# Patient Record
Sex: MALE | Race: WHITE | NOT HISPANIC OR LATINO | Employment: OTHER | URBAN - METROPOLITAN AREA
[De-identification: names, ages, dates, MRNs, and addresses within clinical notes are randomized per-mention and may not be internally consistent; named-entity substitution may affect disease eponyms.]

---

## 2017-12-13 NOTE — PATIENT DISCUSSION
DRY EYES : Discussed with patient the importance of keeping the eye moist and the symptoms associated with dry eyes including blurry vision, tearing, burning, and mariela sensation. Advised patient to minimize use of any fans blowing directly on the face. Advised patient to continue with artificial tears 2-3 times daily.

## 2018-02-24 NOTE — PATIENT DISCUSSION
CATARACTS, OU - VISUALLY SIGNIFICANT. SCHEDULE OD FIRST THEN LATER IN OS IF VISUAL SYMPTOMS PERSIST. negative...

## 2018-03-06 NOTE — PATIENT DISCUSSION
New Prescription: Pred Forte (prednisolone acetate): drops,suspension: 1% 1 drop four times a day into affected eye 03-

## 2018-03-15 NOTE — PATIENT DISCUSSION
Continue: Pred Forte (prednisolone acetate): drops,suspension: 1% 1 drop four times a day into affected eye 03-

## 2018-07-10 NOTE — PATIENT DISCUSSION
Continue: prednisolone acetate (prednisolone acetate): drops,suspension: 1% 1 drop four times a day as directed into affected eye 07-

## 2018-07-10 NOTE — PATIENT DISCUSSION
Continue: ketorolac (ketorolac): drops: 0.5% 1 drop four times a day as directed into affected eye 07-

## 2018-07-10 NOTE — PATIENT DISCUSSION
Continue: ofloxacin (ofloxacin): drops: 0.3% 1 drop four times a day as directed into affected eye 07-

## 2018-09-20 NOTE — PATIENT DISCUSSION
POST-OP INSTRUCTIONS:  The patient is told to resume normal daily activities. New spectacle prescripton given to patient. Patient told to discontinue Acular and Oculfloxacin. Patient to taper prednisolone with a 3,2,1 taper and then discontinue. Will see patient back in 3 months for a DFE. Monitor.

## 2021-06-18 NOTE — PATIENT DISCUSSION
09/20/2018OS-0.75+[id number]&nbsp;SN &nbsp; &nbsp; bem Metronidazole Pregnancy And Lactation Text: This medication is Pregnancy Category B and considered safe during pregnancy.  It is also excreted in breast milk.

## 2021-08-12 ENCOUNTER — NEW REFERRAL (OUTPATIENT)
Dept: URBAN - METROPOLITAN AREA CLINIC 26 | Facility: CLINIC | Age: 71
End: 2021-08-12

## 2021-08-12 VITALS
SYSTOLIC BLOOD PRESSURE: 150 MMHG | HEIGHT: 67 IN | DIASTOLIC BLOOD PRESSURE: 62 MMHG | BODY MASS INDEX: 43 KG/M2 | HEART RATE: 66 BPM | WEIGHT: 274 LBS

## 2021-08-12 DIAGNOSIS — H04.123: ICD-10-CM

## 2021-08-12 DIAGNOSIS — Z79.4: ICD-10-CM

## 2021-08-12 DIAGNOSIS — E11.3313: ICD-10-CM

## 2021-08-12 PROCEDURE — 92134 CPTRZ OPH DX IMG PST SGM RTA: CPT

## 2021-08-12 PROCEDURE — 99204 OFFICE O/P NEW MOD 45 MIN: CPT

## 2021-08-12 PROCEDURE — 67028 INJECTION EYE DRUG: CPT

## 2021-08-12 ASSESSMENT — VISUAL ACUITY
OD_PH: 20/30-1
OS_SC: 20/200
OS_PH: 20/70
OD_SC: 20/40

## 2021-08-12 ASSESSMENT — TONOMETRY
OD_IOP_MMHG: 17
OS_IOP_MMHG: 14

## 2021-09-17 ENCOUNTER — CLINICAL PROCEDURE AND DIAGNOSTIC TESTING ONLY (OUTPATIENT)
Dept: URBAN - METROPOLITAN AREA CLINIC 26 | Facility: CLINIC | Age: 71
End: 2021-09-17

## 2021-09-17 DIAGNOSIS — Z79.4: ICD-10-CM

## 2021-09-17 DIAGNOSIS — E11.3313: ICD-10-CM

## 2021-09-17 PROCEDURE — 67028 INJECTION EYE DRUG: CPT

## 2021-09-17 PROCEDURE — 92250 FUNDUS PHOTOGRAPHY W/I&R: CPT

## 2021-09-17 PROCEDURE — 92134 CPTRZ OPH DX IMG PST SGM RTA: CPT

## 2021-10-22 ENCOUNTER — CLINICAL PROCEDURE AND DIAGNOSTIC TESTING ONLY (OUTPATIENT)
Dept: URBAN - METROPOLITAN AREA CLINIC 26 | Facility: CLINIC | Age: 71
End: 2021-10-22

## 2021-10-22 DIAGNOSIS — E11.3311: ICD-10-CM

## 2021-10-22 DIAGNOSIS — E11.3312: ICD-10-CM

## 2021-10-22 PROCEDURE — 92250 FUNDUS PHOTOGRAPHY W/I&R: CPT

## 2021-10-22 PROCEDURE — 92134 CPTRZ OPH DX IMG PST SGM RTA: CPT

## 2021-10-22 PROCEDURE — 67028 INJECTION EYE DRUG: CPT

## 2021-12-03 ENCOUNTER — FOLLOW UP AND POST INJECTION EVALUATION (OUTPATIENT)
Dept: URBAN - METROPOLITAN AREA CLINIC 26 | Facility: CLINIC | Age: 71
End: 2021-12-03

## 2021-12-03 VITALS — HEIGHT: 60 IN | HEART RATE: 50 BPM | DIASTOLIC BLOOD PRESSURE: 75 MMHG | SYSTOLIC BLOOD PRESSURE: 128 MMHG

## 2021-12-03 DIAGNOSIS — Z79.4: ICD-10-CM

## 2021-12-03 DIAGNOSIS — H04.123: ICD-10-CM

## 2021-12-03 DIAGNOSIS — E11.3312: ICD-10-CM

## 2021-12-03 DIAGNOSIS — E11.3311: ICD-10-CM

## 2021-12-03 PROCEDURE — 67210 TREATMENT OF RETINAL LESION: CPT

## 2021-12-03 PROCEDURE — 92012 INTRM OPH EXAM EST PATIENT: CPT

## 2021-12-03 PROCEDURE — 92250 FUNDUS PHOTOGRAPHY W/I&R: CPT

## 2021-12-03 PROCEDURE — 92235 FLUORESCEIN ANGRPH MLTIFRAME: CPT

## 2021-12-03 ASSESSMENT — VISUAL ACUITY
OS_SC: 20/200
OD_SC: 20/70

## 2021-12-03 ASSESSMENT — TONOMETRY
OS_IOP_MMHG: 10
OD_IOP_MMHG: 12

## 2022-02-25 ENCOUNTER — FOLLOW UP (OUTPATIENT)
Dept: URBAN - METROPOLITAN AREA CLINIC 26 | Facility: CLINIC | Age: 72
End: 2022-02-25

## 2022-02-25 DIAGNOSIS — E11.3311: ICD-10-CM

## 2022-02-25 DIAGNOSIS — E11.3312: ICD-10-CM

## 2022-02-25 PROCEDURE — 67210 TREATMENT OF RETINAL LESION: CPT

## 2022-02-25 PROCEDURE — 92134 CPTRZ OPH DX IMG PST SGM RTA: CPT

## 2022-02-25 ASSESSMENT — TONOMETRY
OS_IOP_MMHG: 12
OD_IOP_MMHG: 11

## 2022-02-25 ASSESSMENT — VISUAL ACUITY
OS_PH: 20/40
OS_SC: 20/80+2
OD_SC: 20/30

## 2022-04-07 ENCOUNTER — EMERGENCY VISIT (OUTPATIENT)
Dept: URBAN - METROPOLITAN AREA CLINIC 26 | Facility: CLINIC | Age: 72
End: 2022-04-07

## 2022-04-07 VITALS — WEIGHT: 274 LBS | HEIGHT: 67 IN | BODY MASS INDEX: 43 KG/M2

## 2022-04-07 DIAGNOSIS — H10.023: ICD-10-CM

## 2022-04-07 DIAGNOSIS — Z79.4: ICD-10-CM

## 2022-04-07 DIAGNOSIS — E11.3313: ICD-10-CM

## 2022-04-07 PROCEDURE — 92012 INTRM OPH EXAM EST PATIENT: CPT

## 2022-04-07 RX ORDER — NEOMYCIN SULFATE, POLYMYXIN B SULFATE AND DEXAMETHASONE SUSPENSION/ DROPS 1; 3.5; 1 MG/ML; MG/ML; [USP'U]/ML: 1 SUSPENSION/ DROPS TOPICAL

## 2022-04-07 ASSESSMENT — VISUAL ACUITY
OD_SC: 20/50+1
OS_PH: 20/50
OS_SC: 20/200
OD_PH: 20/25-1

## 2022-04-07 ASSESSMENT — TONOMETRY
OS_IOP_MMHG: 14
OD_IOP_MMHG: 11

## 2022-06-09 ENCOUNTER — FOLLOW UP (OUTPATIENT)
Dept: URBAN - METROPOLITAN AREA CLINIC 26 | Facility: CLINIC | Age: 72
End: 2022-06-09

## 2022-06-09 VITALS
BODY MASS INDEX: 41.91 KG/M2 | HEART RATE: 61 BPM | DIASTOLIC BLOOD PRESSURE: 71 MMHG | SYSTOLIC BLOOD PRESSURE: 197 MMHG | HEIGHT: 67 IN | WEIGHT: 267 LBS

## 2022-06-09 DIAGNOSIS — H04.123: ICD-10-CM

## 2022-06-09 DIAGNOSIS — H25.13: ICD-10-CM

## 2022-06-09 DIAGNOSIS — Z79.4: ICD-10-CM

## 2022-06-09 DIAGNOSIS — E11.3313: ICD-10-CM

## 2022-06-09 PROCEDURE — 92250 FUNDUS PHOTOGRAPHY W/I&R: CPT

## 2022-06-09 PROCEDURE — 92235 FLUORESCEIN ANGRPH MLTIFRAME: CPT

## 2022-06-09 PROCEDURE — 92134 CPTRZ OPH DX IMG PST SGM RTA: CPT

## 2022-06-09 PROCEDURE — 67028 INJECTION EYE DRUG: CPT

## 2022-06-09 PROCEDURE — 92014 COMPRE OPH EXAM EST PT 1/>: CPT

## 2022-06-09 ASSESSMENT — TONOMETRY
OD_IOP_MMHG: 14
OS_IOP_MMHG: 15

## 2022-06-09 ASSESSMENT — VISUAL ACUITY
OS_SC: 20/80
OD_SC: 20/80
OS_PH: 20/50
OD_PH: 20/40

## 2022-07-09 ENCOUNTER — TELEPHONE ENCOUNTER (OUTPATIENT)
Dept: URBAN - METROPOLITAN AREA CLINIC 121 | Facility: CLINIC | Age: 72
End: 2022-07-09

## 2022-07-10 ENCOUNTER — TELEPHONE ENCOUNTER (OUTPATIENT)
Dept: URBAN - METROPOLITAN AREA CLINIC 121 | Facility: CLINIC | Age: 72
End: 2022-07-10

## 2022-07-10 RX ORDER — ASPIRIN 81 MG/1
TABLET, CHEWABLE ORAL ONCE A DAY
Refills: 0 | Status: ACTIVE | COMMUNITY
Start: 2017-04-05

## 2022-07-10 RX ORDER — INSULIN ASPART 100 [IU]/ML
INJECTION, SOLUTION INTRAVENOUS; SUBCUTANEOUS
Refills: 0 | Status: ACTIVE | COMMUNITY
Start: 2017-04-05

## 2022-07-10 RX ORDER — FENOFIBRATE 134 MG/1
CAPSULE ORAL ONCE A DAY
Refills: 0 | Status: ACTIVE | COMMUNITY
Start: 2017-04-05

## 2022-07-10 RX ORDER — ROSUVASTATIN CALCIUM 40 MG/1
TABLET, FILM COATED ORAL ONCE A DAY
Refills: 0 | Status: ACTIVE | COMMUNITY
Start: 2017-04-05

## 2022-07-10 RX ORDER — INSULIN GLARGINE 100 [IU]/ML
INJECTION, SOLUTION SUBCUTANEOUS ONCE A DAY
Refills: 0 | Status: ACTIVE | COMMUNITY
Start: 2017-04-05

## 2022-07-10 RX ORDER — LISINOPRIL 40 MG/1
TABLET ORAL ONCE A DAY
Refills: 0 | Status: ACTIVE | COMMUNITY
Start: 2017-04-05

## 2022-07-10 RX ORDER — DAPAGLIFLOZIN 5 MG/1
TABLET, FILM COATED ORAL ONCE A DAY
Refills: 0 | Status: ACTIVE | COMMUNITY
Start: 2017-04-05

## 2022-07-10 RX ORDER — METOPROLOL TARTRATE 50 MG/1
TABLET, FILM COATED ORAL ONCE A DAY
Refills: 0 | Status: ACTIVE | COMMUNITY
Start: 2017-04-05

## 2022-07-10 RX ORDER — METFORMIN HCL 1000 MG/1
TABLET ORAL TWICE A DAY
Refills: 0 | Status: ACTIVE | COMMUNITY
Start: 2017-04-05

## 2022-08-18 ENCOUNTER — CLINIC PROCEDURE ONLY (OUTPATIENT)
Dept: URBAN - METROPOLITAN AREA CLINIC 26 | Facility: CLINIC | Age: 72
End: 2022-08-18

## 2022-08-18 DIAGNOSIS — E11.3313: ICD-10-CM

## 2022-08-18 DIAGNOSIS — Z79.4: ICD-10-CM

## 2022-08-18 PROCEDURE — 92250 FUNDUS PHOTOGRAPHY W/I&R: CPT

## 2022-08-18 PROCEDURE — 67028 INJECTION EYE DRUG: CPT

## 2022-08-18 PROCEDURE — 92134 CPTRZ OPH DX IMG PST SGM RTA: CPT

## 2022-09-23 ENCOUNTER — CLINIC PROCEDURE ONLY (OUTPATIENT)
Dept: URBAN - METROPOLITAN AREA CLINIC 26 | Facility: CLINIC | Age: 72
End: 2022-09-23

## 2022-09-23 DIAGNOSIS — E11.3313: ICD-10-CM

## 2022-09-23 DIAGNOSIS — Z79.4: ICD-10-CM

## 2022-09-23 PROCEDURE — 67028 INJECTION EYE DRUG: CPT

## 2022-09-23 PROCEDURE — 92134 CPTRZ OPH DX IMG PST SGM RTA: CPT

## 2022-12-16 ENCOUNTER — FOLLOW UP (OUTPATIENT)
Dept: URBAN - METROPOLITAN AREA CLINIC 26 | Facility: CLINIC | Age: 72
End: 2022-12-16

## 2022-12-16 PROCEDURE — 67028 INJECTION EYE DRUG: CPT

## 2022-12-16 PROCEDURE — 92014 COMPRE OPH EXAM EST PT 1/>: CPT

## 2022-12-16 PROCEDURE — 92250 FUNDUS PHOTOGRAPHY W/I&R: CPT

## 2022-12-16 PROCEDURE — 92134 CPTRZ OPH DX IMG PST SGM RTA: CPT

## 2022-12-16 ASSESSMENT — VISUAL ACUITY
OS_SC: 20/200+2
OS_PH: 20/40
OD_SC: 20/80-1
OD_PH: 20/30-1

## 2022-12-16 ASSESSMENT — TONOMETRY
OS_IOP_MMHG: 13
OD_IOP_MMHG: 17

## 2023-01-27 ENCOUNTER — CLINIC PROCEDURE ONLY (OUTPATIENT)
Dept: URBAN - METROPOLITAN AREA CLINIC 26 | Facility: CLINIC | Age: 73
End: 2023-01-27

## 2023-01-27 DIAGNOSIS — Z79.4: ICD-10-CM

## 2023-01-27 DIAGNOSIS — E11.3313: ICD-10-CM

## 2023-01-27 PROCEDURE — 92134 CPTRZ OPH DX IMG PST SGM RTA: CPT

## 2023-01-27 PROCEDURE — 67028 INJECTION EYE DRUG: CPT

## 2023-04-21 ENCOUNTER — CLINIC PROCEDURE ONLY (OUTPATIENT)
Dept: URBAN - METROPOLITAN AREA CLINIC 26 | Facility: CLINIC | Age: 73
End: 2023-04-21

## 2023-04-21 DIAGNOSIS — E11.3313: ICD-10-CM

## 2023-04-21 DIAGNOSIS — Z79.4: ICD-10-CM

## 2023-04-21 PROCEDURE — 92250 FUNDUS PHOTOGRAPHY W/I&R: CPT

## 2023-04-21 PROCEDURE — 67028 INJECTION EYE DRUG: CPT

## 2023-04-21 PROCEDURE — 92134 CPTRZ OPH DX IMG PST SGM RTA: CPT

## 2023-06-02 ENCOUNTER — CLINIC PROCEDURE ONLY (OUTPATIENT)
Dept: URBAN - METROPOLITAN AREA CLINIC 26 | Facility: CLINIC | Age: 73
End: 2023-06-02

## 2023-06-02 DIAGNOSIS — E11.3313: ICD-10-CM

## 2023-06-02 DIAGNOSIS — Z79.4: ICD-10-CM

## 2023-06-02 PROCEDURE — 92250 FUNDUS PHOTOGRAPHY W/I&R: CPT

## 2023-06-02 PROCEDURE — 67028 INJECTION EYE DRUG: CPT

## 2023-06-02 PROCEDURE — 92134 CPTRZ OPH DX IMG PST SGM RTA: CPT

## 2023-08-25 ENCOUNTER — FOLLOW UP (OUTPATIENT)
Dept: URBAN - METROPOLITAN AREA CLINIC 26 | Facility: CLINIC | Age: 73
End: 2023-08-25

## 2023-08-25 DIAGNOSIS — H02.834: ICD-10-CM

## 2023-08-25 DIAGNOSIS — H25.13: ICD-10-CM

## 2023-08-25 DIAGNOSIS — Z79.4: ICD-10-CM

## 2023-08-25 DIAGNOSIS — H04.123: ICD-10-CM

## 2023-08-25 DIAGNOSIS — H02.831: ICD-10-CM

## 2023-08-25 DIAGNOSIS — E11.3313: ICD-10-CM

## 2023-08-25 PROCEDURE — 92134 CPTRZ OPH DX IMG PST SGM RTA: CPT

## 2023-08-25 PROCEDURE — 92014 COMPRE OPH EXAM EST PT 1/>: CPT

## 2023-08-25 PROCEDURE — 67028 INJECTION EYE DRUG: CPT

## 2023-08-25 ASSESSMENT — VISUAL ACUITY
OS_SC: 20/80+1
OD_SC: 20/40-2
OS_PH: 20/40+2

## 2023-08-25 ASSESSMENT — TONOMETRY
OS_IOP_MMHG: 12
OD_IOP_MMHG: 12

## 2023-10-09 ENCOUNTER — CLINIC PROCEDURE ONLY (OUTPATIENT)
Dept: URBAN - METROPOLITAN AREA CLINIC 26 | Facility: CLINIC | Age: 73
End: 2023-10-09

## 2023-10-09 DIAGNOSIS — Z79.4: ICD-10-CM

## 2023-10-09 DIAGNOSIS — E11.3313: ICD-10-CM

## 2023-10-09 PROCEDURE — 67028 INJECTION EYE DRUG: CPT

## 2023-10-09 PROCEDURE — 92250 FUNDUS PHOTOGRAPHY W/I&R: CPT | Mod: 59

## 2023-10-09 PROCEDURE — 92134 CPTRZ OPH DX IMG PST SGM RTA: CPT

## 2023-11-30 ENCOUNTER — CLINIC PROCEDURE ONLY (OUTPATIENT)
Dept: URBAN - METROPOLITAN AREA CLINIC 26 | Facility: CLINIC | Age: 73
End: 2023-11-30

## 2023-11-30 DIAGNOSIS — Z79.4: ICD-10-CM

## 2023-11-30 DIAGNOSIS — E11.3313: ICD-10-CM

## 2023-11-30 PROCEDURE — Q5128DME CIMERLI 0.3MG

## 2023-11-30 PROCEDURE — 67028 INJECTION EYE DRUG: CPT

## 2023-11-30 PROCEDURE — 92134 CPTRZ OPH DX IMG PST SGM RTA: CPT

## 2023-11-30 PROCEDURE — 92250 FUNDUS PHOTOGRAPHY W/I&R: CPT

## 2024-01-22 ENCOUNTER — CLINIC PROCEDURE ONLY (OUTPATIENT)
Dept: URBAN - METROPOLITAN AREA CLINIC 26 | Facility: CLINIC | Age: 74
End: 2024-01-22

## 2024-01-22 DIAGNOSIS — Z79.4: ICD-10-CM

## 2024-01-22 DIAGNOSIS — E11.3313: ICD-10-CM

## 2024-01-22 PROCEDURE — 92134 CPTRZ OPH DX IMG PST SGM RTA: CPT

## 2024-01-22 PROCEDURE — 67028 INJECTION EYE DRUG: CPT

## 2024-01-22 PROCEDURE — Q5128DME CIMERLI 0.3MG

## 2024-03-07 ENCOUNTER — FOLLOW UP (OUTPATIENT)
Dept: URBAN - METROPOLITAN AREA CLINIC 26 | Facility: CLINIC | Age: 74
End: 2024-03-07

## 2024-03-07 DIAGNOSIS — H04.123: ICD-10-CM

## 2024-03-07 DIAGNOSIS — Z79.4: ICD-10-CM

## 2024-03-07 DIAGNOSIS — H02.831: ICD-10-CM

## 2024-03-07 DIAGNOSIS — H02.834: ICD-10-CM

## 2024-03-07 DIAGNOSIS — E11.3313: ICD-10-CM

## 2024-03-07 DIAGNOSIS — H25.13: ICD-10-CM

## 2024-03-07 PROCEDURE — 67028 INJECTION EYE DRUG: CPT

## 2024-03-07 PROCEDURE — 92134 CPTRZ OPH DX IMG PST SGM RTA: CPT

## 2024-03-07 PROCEDURE — 92012 INTRM OPH EXAM EST PATIENT: CPT

## 2024-03-07 PROCEDURE — 92250 FUNDUS PHOTOGRAPHY W/I&R: CPT

## 2024-03-07 ASSESSMENT — VISUAL ACUITY
OD_SC: 20/40
OS_SC: 20/60-2

## 2024-03-07 ASSESSMENT — TONOMETRY
OD_IOP_MMHG: 13
OS_IOP_MMHG: 14

## 2024-05-02 ENCOUNTER — CLINIC PROCEDURE ONLY (OUTPATIENT)
Dept: URBAN - METROPOLITAN AREA CLINIC 26 | Facility: CLINIC | Age: 74
End: 2024-05-02

## 2024-05-02 DIAGNOSIS — E11.3313: ICD-10-CM

## 2024-05-02 DIAGNOSIS — Z79.4: ICD-10-CM

## 2024-05-02 PROCEDURE — 92134 CPTRZ OPH DX IMG PST SGM RTA: CPT

## 2024-05-02 PROCEDURE — 67028 INJECTION EYE DRUG: CPT

## 2024-06-20 ENCOUNTER — CLINIC PROCEDURE ONLY (OUTPATIENT)
Dept: URBAN - METROPOLITAN AREA CLINIC 26 | Facility: CLINIC | Age: 74
End: 2024-06-20

## 2024-06-20 DIAGNOSIS — Z79.4: ICD-10-CM

## 2024-06-20 DIAGNOSIS — E11.3313: ICD-10-CM

## 2024-06-20 PROCEDURE — 92134 CPTRZ OPH DX IMG PST SGM RTA: CPT

## 2024-06-20 PROCEDURE — 67028 INJECTION EYE DRUG: CPT

## 2024-09-19 ENCOUNTER — CLINIC PROCEDURE ONLY (OUTPATIENT)
Dept: URBAN - METROPOLITAN AREA CLINIC 26 | Facility: CLINIC | Age: 74
End: 2024-09-19

## 2024-09-19 DIAGNOSIS — Z79.4: ICD-10-CM

## 2024-09-19 DIAGNOSIS — E11.3313: ICD-10-CM

## 2024-09-19 PROCEDURE — 67028 INJECTION EYE DRUG: CPT

## 2024-09-19 PROCEDURE — 92250 FUNDUS PHOTOGRAPHY W/I&R: CPT

## 2024-09-19 PROCEDURE — 92134 CPTRZ OPH DX IMG PST SGM RTA: CPT

## 2024-11-14 ENCOUNTER — CLINIC PROCEDURE ONLY (OUTPATIENT)
Dept: URBAN - METROPOLITAN AREA CLINIC 26 | Facility: CLINIC | Age: 74
End: 2024-11-14

## 2024-11-14 DIAGNOSIS — Z79.4: ICD-10-CM

## 2024-11-14 DIAGNOSIS — E11.3313: ICD-10-CM

## 2024-11-14 PROCEDURE — 67028 INJECTION EYE DRUG: CPT

## 2024-11-14 PROCEDURE — 92250 FUNDUS PHOTOGRAPHY W/I&R: CPT

## 2024-11-14 PROCEDURE — 92134 CPTRZ OPH DX IMG PST SGM RTA: CPT

## 2025-01-16 ENCOUNTER — COMPREHENSIVE EXAM (OUTPATIENT)
Age: 75
End: 2025-01-16

## 2025-01-16 VITALS
HEART RATE: 67 BPM | WEIGHT: 265 LBS | SYSTOLIC BLOOD PRESSURE: 136 MMHG | HEIGHT: 67 IN | BODY MASS INDEX: 41.59 KG/M2 | DIASTOLIC BLOOD PRESSURE: 63 MMHG

## 2025-01-16 DIAGNOSIS — H02.831: ICD-10-CM

## 2025-01-16 DIAGNOSIS — H02.834: ICD-10-CM

## 2025-01-16 DIAGNOSIS — E11.3313: ICD-10-CM

## 2025-01-16 DIAGNOSIS — Z79.4: ICD-10-CM

## 2025-01-16 DIAGNOSIS — H04.123: ICD-10-CM

## 2025-01-16 DIAGNOSIS — H25.13: ICD-10-CM

## 2025-01-16 PROCEDURE — 92014 COMPRE OPH EXAM EST PT 1/>: CPT | Mod: 25

## 2025-01-16 PROCEDURE — 92235 FLUORESCEIN ANGRPH MLTIFRAME: CPT

## 2025-01-16 PROCEDURE — 92134 CPTRZ OPH DX IMG PST SGM RTA: CPT

## 2025-01-16 PROCEDURE — 92250 FUNDUS PHOTOGRAPHY W/I&R: CPT | Mod: 59

## 2025-01-16 PROCEDURE — 67028 INJECTION EYE DRUG: CPT

## 2025-03-28 ENCOUNTER — CLINIC PROCEDURE ONLY (OUTPATIENT)
Age: 75
End: 2025-03-28

## 2025-03-28 DIAGNOSIS — E11.3313: ICD-10-CM

## 2025-03-28 DIAGNOSIS — Z79.4: ICD-10-CM

## 2025-03-28 PROCEDURE — 92250 FUNDUS PHOTOGRAPHY W/I&R: CPT

## 2025-03-28 PROCEDURE — 92134 CPTRZ OPH DX IMG PST SGM RTA: CPT

## 2025-03-28 PROCEDURE — 67028 INJECTION EYE DRUG: CPT

## 2025-06-12 ENCOUNTER — CLINIC PROCEDURE ONLY (OUTPATIENT)
Age: 75
End: 2025-06-12

## 2025-06-12 DIAGNOSIS — E11.3313: ICD-10-CM

## 2025-06-12 DIAGNOSIS — Z79.4: ICD-10-CM

## 2025-06-12 PROCEDURE — 67028 INJECTION EYE DRUG: CPT

## 2025-06-12 PROCEDURE — 92250 FUNDUS PHOTOGRAPHY W/I&R: CPT | Mod: 59

## 2025-06-12 PROCEDURE — 92134 CPTRZ OPH DX IMG PST SGM RTA: CPT
